# Patient Record
Sex: MALE | Race: OTHER | Employment: UNEMPLOYED | URBAN - METROPOLITAN AREA
[De-identification: names, ages, dates, MRNs, and addresses within clinical notes are randomized per-mention and may not be internally consistent; named-entity substitution may affect disease eponyms.]

---

## 2019-12-12 ENCOUNTER — OFFICE VISIT (OUTPATIENT)
Dept: ORTHOPEDIC SURGERY | Age: 24
End: 2019-12-12
Payer: COMMERCIAL

## 2019-12-12 VITALS
WEIGHT: 180 LBS | DIASTOLIC BLOOD PRESSURE: 69 MMHG | SYSTOLIC BLOOD PRESSURE: 126 MMHG | HEIGHT: 68 IN | HEART RATE: 53 BPM | BODY MASS INDEX: 27.28 KG/M2

## 2019-12-12 DIAGNOSIS — M76.821 POSTERIOR TIBIAL TENDONITIS OF RIGHT LEG: Primary | ICD-10-CM

## 2019-12-12 PROCEDURE — 4004F PT TOBACCO SCREEN RCVD TLK: CPT | Performed by: ORTHOPAEDIC SURGERY

## 2019-12-12 PROCEDURE — 99203 OFFICE O/P NEW LOW 30 MIN: CPT | Performed by: ORTHOPAEDIC SURGERY

## 2019-12-12 PROCEDURE — G8427 DOCREV CUR MEDS BY ELIG CLIN: HCPCS | Performed by: ORTHOPAEDIC SURGERY

## 2019-12-12 PROCEDURE — G8419 CALC BMI OUT NRM PARAM NOF/U: HCPCS | Performed by: ORTHOPAEDIC SURGERY

## 2019-12-12 PROCEDURE — G8484 FLU IMMUNIZE NO ADMIN: HCPCS | Performed by: ORTHOPAEDIC SURGERY

## 2019-12-12 ASSESSMENT — ENCOUNTER SYMPTOMS
COLOR CHANGE: 0
NAUSEA: 0
APNEA: 0
ABDOMINAL PAIN: 0
DIARRHEA: 0
ABDOMINAL DISTENTION: 0
CONSTIPATION: 0
VOMITING: 0
CHEST TIGHTNESS: 0
SHORTNESS OF BREATH: 0
COUGH: 0

## 2020-12-30 ENCOUNTER — OFFICE VISIT (OUTPATIENT)
Dept: ORTHOPEDIC SURGERY | Age: 25
End: 2020-12-30
Payer: COMMERCIAL

## 2020-12-30 VITALS
TEMPERATURE: 97.1 F | BODY MASS INDEX: 27.28 KG/M2 | HEART RATE: 90 BPM | WEIGHT: 180 LBS | DIASTOLIC BLOOD PRESSURE: 87 MMHG | HEIGHT: 68 IN | SYSTOLIC BLOOD PRESSURE: 145 MMHG

## 2020-12-30 PROCEDURE — 4004F PT TOBACCO SCREEN RCVD TLK: CPT | Performed by: PHYSICIAN ASSISTANT

## 2020-12-30 PROCEDURE — G8419 CALC BMI OUT NRM PARAM NOF/U: HCPCS | Performed by: PHYSICIAN ASSISTANT

## 2020-12-30 PROCEDURE — G8427 DOCREV CUR MEDS BY ELIG CLIN: HCPCS | Performed by: PHYSICIAN ASSISTANT

## 2020-12-30 PROCEDURE — G8484 FLU IMMUNIZE NO ADMIN: HCPCS | Performed by: PHYSICIAN ASSISTANT

## 2020-12-30 PROCEDURE — 99213 OFFICE O/P EST LOW 20 MIN: CPT | Performed by: PHYSICIAN ASSISTANT

## 2020-12-30 ASSESSMENT — ENCOUNTER SYMPTOMS
CHEST TIGHTNESS: 0
VOMITING: 0
CONSTIPATION: 0
APNEA: 0
DIARRHEA: 0
RESPIRATORY NEGATIVE: 1
COLOR CHANGE: 0
COUGH: 0
NAUSEA: 0
ABDOMINAL DISTENTION: 0
ABDOMINAL PAIN: 0
SHORTNESS OF BREATH: 0

## 2020-12-30 NOTE — PROGRESS NOTES
[facility] [address] AND SPORTS MEDICINE  [address] [place] [id number]  Dept: [phone]  Dept Fax: [phone]        Left Hip Office Visit    Subjective:     Chief Complaint   Patient presents with    Leg Pain     Right hamstring pain      HPI:     Gary Leyva presents with a 4 day history of pain in the left hamstring. He was playing flag football on Sunday and he had pain in the back of his leg. He states he had an injury similar to this over the summer. It seems to keep happening so he came in to be seen. He has never had a surgery on his knee/leg. He has tried icing and a massager. He states he has bruising and swelling. Occupation: Nurse at [employer].     ROS:     Review of Systems   Constitutional: Positive for activity change. Negative for appetite change, fatigue and fever. Respiratory: Negative. Negative for apnea, cough, chest tightness and shortness of breath. Cardiovascular: Negative. Negative for chest pain, palpitations and leg swelling. Gastrointestinal: Negative for abdominal distention, abdominal pain, constipation, diarrhea, nausea and vomiting. Genitourinary: Negative for difficulty urinating, dysuria and hematuria. Musculoskeletal: Positive for gait problem and myalgias. Negative for arthralgias and joint swelling. Skin: Negative for color change and rash. Neurological: Positive for weakness. Negative for dizziness, numbness and headaches. Psychiatric/Behavioral: Negative for sleep disturbance. Past Medical History:    Past Medical History:   Diagnosis Date    Acid reflux     Depression        Past Surgical History:    No past surgical history on file. CurrentMedications:   No current outpatient medications on file. No current facility-administered medications for this visit. Allergies:    Patient has no known allergies.     Social History:   Social History Socioeconomic History    Marital status: Single     Spouse name: Not on file    Number of children: Not on file    Years of education: Not on file    Highest education level: Not on file   Occupational History    Not on file   Social Needs    Financial resource strain: Not on file    Food insecurity     Worry: Not on file     Inability: Not on file    Transportation needs     Medical: Not on file     Non-medical: Not on file   Tobacco Use    Smoking status: Light Tobacco Smoker     Types: Cigars   Substance and Sexual Activity    Alcohol use: No    Drug use: No    Sexual activity: Not on file   Lifestyle    Physical activity     Days per week: Not on file     Minutes per session: Not on file    Stress: Not on file   Relationships    Social connections     Talks on phone: Not on file     Gets together: Not on file     Attends Religion service: Not on file     Active member of club or organization: Not on file     Attends meetings of clubs or organizations: Not on file     Relationship status: Not on file    Intimate partner violence     Fear of current or ex partner: Not on file     Emotionally abused: Not on file     Physically abused: Not on file     Forced sexual activity: Not on file   Other Topics Concern    Not on file   Social History Narrative    Not on file       Family History:  No family history on file. Vitals:   BP (!) 145/87   Pulse 90   Temp 97.1 °F (36.2 °C)   Ht 5' 8\" (1.727 m)   Wt 180 lb (81.6 kg)   BMI 27.37 kg/m²  Body mass index is 27.37 kg/m². Physical Examination:     Orthopedics:    GENERAL: Alert and oriented X3 in no acute distress. SKIN: Intact without lesions or ulcerations. Positive ecchymosis  NEURO: Intact to sensory and motor testing. VASC: Capillary refill is less than 3 seconds. Right Hamstring     GEN: Alert and oriented X 3, in no acute distress. GAIT: The patient's gait was observed while entering the exam room and was noted to be non antalgic. The extremity is in anatomic alignment. SKIN: Intact without rashes, lesions, or ulcerations. No obvious deformity or swelling. NEURO: The patient responds to light touch throughout bilateral LE. Patellar and Achilles reflexes are 2/4. VASC: The bilateral LE is neurovascularly intact with 2/4 DP and 2/4 PT pulses. Brisk capillary refill. ROM: Range of motion of the hip and the knee are full and nonpainful. MUSC: Strength is 5/5 flexion, abduction, internal rotation, external rotation. PALP: Pain to palpation of the distal hamstring more lateral and middle. No obvious deformity. Pain to palpation of the hematoma of the lateral hamstring    Assessment:     1. Hamstring strain, left, initial encounter      Procedures:    Procedure: no  Radiology:   No results found.    Plan: Electronically signed by Rocio Queen PA-C, on 12/31/2020 at 2:55 PM      Electronically signed by Rocio Queen PA-C, on 12/31/2020 at 2:53 PM

## 2020-12-30 NOTE — PATIENT INSTRUCTIONS
[address] [place] [address] and Sports Medicine    Dr. Paris Robbins,  & Osmany Zimmerman PA-C, ATC    Over the counter anti-inflammatory protocol (NSAIDS)    You may take the following over the counter medication for only 10-14 days to  reduce inflammation. If you develop an upset stomach, diarrhea, heartburn/GERD symptoms   discontinue immediately. If you need the medication on a long-term basis >greater than 10-14 days. Please contact your family doctor/PCP to discuss your options as you   will require ongoing medical monitoring. Over the Counter/OTC             Ibuprofen/Advil/Motrin                                                                                                            200 mg tablets                  3-4 tables 3 times a day with food             OR            Naproxen Sodium, Aleve                    220 mg tablets          2 tablets 2 times a day with food           You May Add                           Tylenol extra strength, Acetaminophen           500 mg tablets               2 tablets every 8 hours    You may alternate with the NSAIDS for pain. NO more than 3000 mg of Tylenol/acetaminophen in 24 hours. '  Look at any OTC medications for added  Tylenol/acetaminophen--cold/sinus/flu medication. Patient Education        Hamstring Strain: Care Instructions  Your Care Instructions     A hamstring strain happens when you overstretch, or pull, the muscles that run down the back of your thigh. It can happen when you exercise or lift something or if you're injured in an accident. You may feel pain and tenderness that's worse when you move your injured leg. The back of your thigh may be swollen and bruised. If you have a bad strain, you may not be able to move your leg normally. While a minor strain often heals well with rest and other treatment, a severe strain may require medical treatment. If a severe strain isn't treated, you may have long-lasting problems. Follow-up care is a key part of your treatment and safety. Be sure to make and go to all appointments, and call your doctor if you are having problems. It's also a good idea to know your test results and keep a list of the medicines you take. How can you care for yourself at home? · Rest your injured leg. Don't put weight on it for a day or two. If your doctor advises you to, use crutches to rest the leg. · Put ice or a cold pack on the back of your thigh for 10 to 20 minutes at a time to stop swelling. Put a thin cloth between the ice and your skin. · Wrapping your thigh with an elastic bandage (such as an Ace wrap), will help decrease swelling. Don't wrap it too tightly, since this can cause more swelling below the affected area. · Elevate your thigh on pillows while applying ice and anytime you are sitting or lying down. · Ask your doctor if you can take an over-the-counter pain medicine, such as acetaminophen (Tylenol), ibuprofen (Advil, Motrin), or naproxen (Aleve). Be safe with medicines. Read and follow all instructions on the label. · Don't do anything that makes the pain worse. Return to your usual level of activity slowly. When should you call for help? Call your doctor now or seek immediate medical care if:    · You have severe or increasing pain.     · You have tingling, weakness, or numbness in your injured leg.     · You cannot move your injured leg. Watch closely for changes in your health, and be sure to contact your doctor if:    · You do not get better as expected. Where can you learn more? Go to https://[employer] Solar Systems.Dynis. org and sign in to your [employer] account. Enter Y495 in the [employer] box to learn more about \"Hamstring Strain: Care Instructions. \"     If you do not have an account, please click on the \"Sign Up Now\" link. Current as of: March 2, 2020               Content Version: 12.6  © 6542-7478 [employer]. Care instructions adapted under license by [facility] ([facility]). If you have questions about a medical condition or this instruction, always ask your healthcare professional. [place] [address] any warranty or liability for your use of this information. Patient Education        Hamstring Strain: Rehab Exercises  Introduction  Here are some examples of exercises for you to try. The exercises may be suggested for a condition or for rehabilitation. Start each exercise slowly. Ease off the exercises if you start to have pain. You will be told when to start these exercises and which ones will work best for you. How to do the exercises  Hamstring set (heel dig)   1. Sit with your affected leg bent. Your good leg should be straight and supported on the floor. 2. Tighten the muscles on the back of your bent leg (hamstring) by pressing your heel into the floor. 3. Hold for about 6 seconds, and then rest for up to 10 seconds. 4. Repeat 8 to 12 times. Hamstring curl   1. Lie on your stomach with your knees straight. Place a pillow under your stomach. If your kneecap is uncomfortable, roll up a washcloth and put it under your leg just above your kneecap. 2. Lift the foot of your affected leg by bending your knee so that you bring your foot up toward your buttock. If this motion hurts, try it without bending your knee quite as far. This may help you avoid any painful motion. 3. Slowly move your leg up and down. 4. Repeat 8 to 12 times. 5. When you can do this exercise with ease and no pain, add some resistance. To do this:  6. Tie the ends of an exercise band together to form a loop. Attach one end of the loop to a secure object or shut a door on it to hold it in place. (Or you can have someone hold one end of the loop to provide resistance.)  7. Loop the other end of the exercise band around the lower part of your affected leg. 8. Repeat steps 1 through 4, slowly pulling back on the exercise band with your leg. Hip extension   1. Stand facing a wall with your hands on the wall at about chest level. 2. Keeping the knee of your affected leg straight, kick that leg straight back behind you. 3. Relax, and lower your leg back to the starting position. 4. Repeat 8 to 12 times. 5. When you can do this exercise with ease and no pain, add some resistance. To do this:  6. Tie the ends of an exercise band together to form a loop. Attach one end of the loop to a secure object or shut a door on it to hold it in place. (Or you can have someone hold one end of the loop to provide resistance.)  7. Loop the other end of the exercise band around the lower part of your affected leg. 8. Repeat steps 1 through 4, slowly pulling back on the exercise band with your leg. Hamstring wall stretch   1. Lie on your back in a doorway, with your good leg through the open door. 2. Slide your affected leg up the wall to straighten your knee. You should feel a gentle stretch down the back of your leg. 3. Hold the stretch for at least 1 minute to begin. Then try to lengthen the time you hold the stretch to as long as 6 minutes. 4. Repeat 2 to 4 times. 5. If you do not have a place to do this exercise in a doorway, there is another way to do it:  6. Lie on your back, and bend the knee of your affected leg. 7. Loop a towel under the ball and toes of that foot, and hold the ends of the towel in your hands. 8. Straighten your knee, and slowly pull back on the towel. You should feel a gentle stretch down the back of your leg. 9. Hold the stretch for 15 to 30 seconds. Or even better, hold the stretch for 1 minute if you can. 10. Repeat 2 to 4 times. 1. Do not arch your back. 2. Do not bend either knee. 3. Keep one heel touching the floor and the other heel touching the wall. Do not point your toes.     Calf stretch 1. Stand facing a wall with your hands on the wall at about eye level. Put your affected leg about a step behind your other leg. 2. Keeping your back leg straight and your back heel on the floor, bend your front knee and gently bring your hip and chest toward the wall until you feel a stretch in the calf of your back leg. 3. Hold the stretch for 15 to 30 seconds. 4. Repeat 2 to 4 times. 5. Repeat steps 1 through 4, but this time keep your back knee bent. Single-leg balance   1. Stand on a flat surface with your arms stretched out to your sides like you are making the letter \"T. \" Then lift your good leg off the floor, bending it at the knee. If you are not steady on your feet, use one hand to hold on to a chair, counter, or wall. 2. Standing on your affected leg, keep that knee straight. Try to balance on that leg for up to 30 seconds. Then rest for up to 10 seconds. 3. Repeat 6 to 8 times. 4. When you can balance on your affected leg for 30 seconds with your eyes open, try to balance on it with your eyes closed. 5. When you can do this exercise with your eyes closed for 30 seconds and with ease and no pain, try standing on a pillow or piece of foam, and repeat steps 1 through 4. Follow-up care is a key part of your treatment and safety. Be sure to make and go to all appointments, and call your doctor if you are having problems. It's also a good idea to know your test results and keep a list of the medicines you take. Where can you learn more? Go to https://zac.Gema Touch. org and sign in to your [employer] account. Enter [id number] [id number] in the [employer] box to learn more about \"Hamstring Strain: Rehab Exercises. \"     If you do not have an account, please click on the \"Sign Up Now\" link. Current as of: March 2, 2020               Content Version: 12.6  © 7385-6707 [employer].

## 2021-01-05 ENCOUNTER — HOSPITAL ENCOUNTER (OUTPATIENT)
Dept: PHYSICAL THERAPY | Facility: CLINIC | Age: 26
Setting detail: THERAPIES SERIES
Discharge: HOME | End: 2021-01-05
Payer: COMMERCIAL

## 2021-01-05 PROCEDURE — 97110 THERAPEUTIC EXERCISES: CPT

## 2021-01-05 PROCEDURE — 97161 PT EVAL LOW COMPLEX 20 MIN: CPT

## 2021-01-05 NOTE — CONSULTS
[] Candy Salazar        Outpatient Physical                Therapy       [address]       Phone: [phone]       Fax: [phone] [x] [facility]       Promotion at [address] [facility]       Phone: [phone]       Fax: [phone] [] [facility]      for [facility]    [address]     Phone: [phone]     Fax:  [phone]     Physical Therapy Lower Extremity Evaluation    Date:  2021  Patient: Doug Holder  : 1995  MRN: [id number]  Physician: Yelena Blair PA-C   Insurance: [employer] (25 vs)  Medical Diagnosis: Hamstring strain, left, initial encounter  Rehab Codes: X10.217D, R53.1, [id number], M25.662, M25.562  Onset date: 20  Next 's appt. : 20        Subjective:    CC: Pt arrives for physical therapy evaluation of left hamstring strain - notes he pulled it this summer playing flag football, felt a pop. Took himself out of playing for 2 months, no formal PT, then played his first game again 2 weeks ago and it was swollen/bruised, very painful after this. Notes he's had prior injuries like this on both hamstrings, but only LLE currently.          Prior Level of Function: pain free with all ADL/IADLs, running, playing flag football     Current Level of Function: unable to play flag football/workout 3 days a week (mix of UE/LE), hasn't attempted running d/t pain; some pain with squatting/functional movements      Red Flags:      Yes  No Comments   Fatigue [] [x]     Fever/chills/sweats [] [x]    Malaise  [] [x]    Mental status change [] [x]     Paresthesias/numbness/weakness [] [x]     Unexplained weight changes [] [x]     Lightheaded/dizziness [] [x]    Shortness of breath [] [x]           PMHx: [] Unremarkable [] Diabetes [] HTN  [] Pacemaker   [] MI/Heart Problems [] Cancer [] Arthritis [] Other:              [x] Refer to full medical chart  In EPIC   Tests: [] X-Ray: [] MRI:  [] Other:     Comorbidities:   [] Obesity [] Dialysis  [] Other:   [] Asthma/COPD [] Dementia [] Other:   [] Stroke [] Sleep apnea [] Other:   [] Vascular disease [] Rheumatic disease [] Other:       Medications: [x] Refer to full medical record [] None [] Other:  Allergies:      [x] Refer to full medical record [] None [] Other:    Working:  Nurse at [employer]  Job/ADL Description: walking, stooping, bending; no heavy lifting per pt      Pain:  [] Yes  [x] No Location: left posterior hamstring Pain Rating: (0-10 scale) 0/10  Pain altered Tx:  [] Yes  [x] No  Action:  Symptoms:  [x] Improving [] Worsening [] Same  Better:  Rest, ice  Worse: exercising  Sleep: [x] OK    [] Disturbed    Patient Goals: return to [employer] football, regular exercising        OBJECTIVE:    Observation:  OBSERVATION No Deficit Deficit Not Tested Comments   Posture   x       Palpation [] [x] [] TTP semitendinosis/membranosis muscle belly, no tenderness biceops femoris   Sensation [x] [] []    Edema [x] [] []  No popliteal fossa edema appreciated      Neurological [] [] [x]     Patellar Mobility [] [] [x]     Patellar Orientation [] [] [x]     Gait [x] [] [] No apparent deficit, good gait speed, no antalgia             ROM  ° A/P STRENGTH    Left Right Left Right   Hip Flex WNL WNL 5 5   Ext   4+ 5-   ER WNL WNL     IR WNL WNL     ABD   5- 5   ADD   5 5   Knee Flx       Ext 0 0 5 5   Ankle DF tight tight 5 5   PF   5- 5-   INV       EVER              SLR: LLE to 80deg, RLE to 75deg (pt notes he has been stretching LLE since injury)    Special Tests: Positive: tight muscularly/tender in lateral hamstring muscle belly, distally      Negative: Ely's for rectus femoris tightness       Functional Tests:   - Stairs: reciprocal ascent/descent without UE assist, no difficulty or pain  - Lunge: Hand support at knee with descent for both, no pain; quad dominant form      Functional Assessment    FUNCTION Normal Difficult Unable Comments   Sitting [x] [] [] in contralateral hand without increased pain, indicating improved tolerance to load  4. Able to run at self selected pace on treadmill for 10 min without increased pain in hamstring during or post workout, demonstrating good mechanics                      Patient goals: \"return to flag football, regular work outs\"      Outcome Measure on Initial Eval:  LEFI: 66/80, 83% function     Additional Outcome Measures Used: None        Rehab Potential:  [x] Good  [] Fair  [] Poor   Suggested Professional Referral:  [x] No  [] Yes:  Barriers to Goal Achievement[de-identified]  [x] No  [] Yes:  Domestic Concerns:  [x] No  [] Yes:    Pt. Education:  [x] Plans/Goals, Risks/Benefits discussed  [x] Home exercise program    Method of Education: [x] Verbal  [x] Demo  [x] Written  Comprehension of Education:  [x] Verbalizes understanding. [x] Demonstrates understanding. [] Needs Review. [] Demonstrates/verbalizes understanding of HEP/Ed previously given. Treatment Plan:  [x] Therapeutic Exercise   51160  [] Iontophoresis: 4 mg/mL Dexamethasone Sodium Phosphate  mAmin  49101   [x] Therapeutic Activity  33848 [x] Vasopneumatic cold with compression  12669    [x] Gait Training   57060 [] Ultrasound   99800   [x] Neuromuscular Re-education  00412 [] Electrical Stimulation Unattended  70168   [x] Manual Therapy  82365 [] Electrical Stimulation Attended  41505   [x] Instruction in HEP  [] Dry Needling   [] Aquatic Therapy   38678 [x] Cold/hotpack    [] Massage   18821      [] Lumbar/Cervical Traction  93624     []  Medication allergies reviewed for use of    Dexamethasone Sodium Phosphate 4mg/ml     with iontophoresis treatments. Pt is not allergic.     Frequency:  2 x/week for 16 visits        Todays Treatment:  Modalities:   Precautions:  Exercises:  Exercise Reps/ Time Weight/ Level Comments   Supine      90/90 hamstring stretch 2x15     Hamstring iso 10x3\" ea 90deg  40deg    Bridges 20x  Easy   SL bridges 2x10 LLE    SL hip abd ADD         SLS w/ arm raise 20x           Other: Pt education provided re: pathology, PT POC - billed with therex    Specific Instructions for next treatment:   - progress to resisted hamstring strengthening in prone, standing as tolerated  - SLS standing with perturbations  - bridges with concurrent heel slide out (short range to avoid excessive lengthening)  - dead lifting, stiff leg and bend leg - low weights to start  - lunges, squatting      Evaluation Complexity:  History (Personal factors, comorbidities) [] 0 [x] 1-2 [] 3+   Exam (limitations, restrictions) [] 1-2 [] 3 [x] 4+   Clinical presentation (progression) [x] Stable [] Evolving  [] Unstable   Decision Making [x] Low [] Moderate [] High    [x] Low Complexity [] Moderate Complexity [] High Complexity       Treatment Charges: Mins Units   [x] Evaluation       []  Low       []  Moderate       []  High 32 1   []  Modalities     [x]  Ther Exercise 20 1   []  Manual Therapy     []  Ther Activities     []  Aquatics     []  Vasocompression     []  Other       TOTAL TREATMENT TIME: 52 min    Time in:9:40 am   Time Out: 10:35 am    Electronically signed by: Caro Macdonald PT        Physician Signature:________________________________Date:__________________  By signing above or cosigning this note, I have reviewed this plan of care and certify a need for medically necessary rehabilitation services.      *PLEASE SIGN ABOVE AND FAX BACK ALL PAGES*

## 2021-01-12 ENCOUNTER — HOSPITAL ENCOUNTER (OUTPATIENT)
Dept: PHYSICAL THERAPY | Facility: CLINIC | Age: 26
Setting detail: THERAPIES SERIES
Discharge: HOME | End: 2021-01-12
Payer: COMMERCIAL

## 2021-01-12 PROCEDURE — 97110 THERAPEUTIC EXERCISES: CPT

## 2021-01-12 NOTE — FLOWSHEET NOTE
[] [facility]  [address]  [phone]  F: [phone] [x] [address]  [place] [address]   [address]  P: [phone]  F: [phone] [] [place]  [address]  P: [phone]  F: [phone] [] [address]  P: [phone]  F: [phone] [] [address]  [facility]   [address]   [place]: [phone]  F: [phone]      Physical Therapy Daily Treatment Note    Date:  2021  Patient Name:  Lachelle Villagomez    :  1995  MRN: [id number]  Physician: Chen Healy PA-C                              Insurance: [employer] (25 vs)  Medical Diagnosis: Hamstring strain, left, initial encounter  Rehab Codes: Q00.341K, R53.1, E459983, M25.662, M25.562  Onset date: 20             Next Dr's appt. : 20  Visit# / total visits:      Cancels/No Shows: 0 cx / 1 ns    Subjective:    Pain:  [] Yes  [x] No Location:  N/A Pain Rating: (0-10 scale) 0/10  Pain altered Tx:  [x] No  [] Yes  Action:  Comments: Pt arrives noting no pain at rest currently - states he was able to run yesterday at the gym for 1 mi without issues. States he's usually okay running, it hurts more when rapid starting/stopping for flag football (hasn't done this kind of exercising since injury).     Objective:  Modalities:   Precautions:  Exercises:  Exercise Reps/ Time Weight/ Level Comments   T[employer]rix bike 5 min           Supine      Hamstring 90/90 stretch 2x20 ea     Hamstring iso 10x ea     SL bridges 2x15 ea     Bridges w/ heel slider 6x  Too much compensation on mat - complete with physioball next sessions if able         TrA set 15x3\"     TrA w/ diaphragmatic breathing 3x5 breaths     TrA w/ DKTC to out straight 3x10 Prone      Resisted ham curl 3x15 ea grey    Flying squirrels 2x15           Standing      SLS on foam 3x30\" ea  TrA contraction during   SLS, foam, ball catch 2x8 ea  Straight throw   SLS, foam, ball twist and throw 5x ea  Twist laterally and throw to therapist   SLS, foam, perturbations   Add in upcoming sessions         Squats 3x10 20#    Deadlift hinge x10  x10 10#  20# Stiff leg, hinging at hips    Nordics   Add in upcoming sessions         Agility ladder   Add in upcoming sessions         Finishing stretches      Calf incline stretch 3x30\"     Hamstring 90/90 stretch 20x ea           Other:      Treatment Charges: Mins Units   []  Modalities     [x]  Ther Exercise 65 4   []  Manual Therapy     []  Ther Activities     []  Aquatics     []  Vasocompression     []  Other     Total Treatment time 65 4       Assessment: [x] Progressing toward goals. Initiated treatment focused on concentric/eccentric hamstring strengthening (L>R but bilat since this condition has happened bilat) as well as neuromuscular control training in SLS. Pt does well with all, intermittent cuing for proper completion d/t being new exercises. Introduced TrA core training with diphragmatic breathing to allow for no \"bearing down\" when analisa core; difficult for pt with diaphragm control but able to complete with good TrA activation. Some medial R proximal calf pain noted, this clears up with calf stretch. No hamstring pain reported throughout, just fatigue. [] No change. [] Other:  [x] Patient would continue to benefit from skilled physical therapy services in order to: progress hamstring strength both in open/closed chain, progress to dynamic lengthening/eccentric strengthening, agility training and progressing back to sport. STG: (to be met in 8 treatments)  1. ? Pain: No more than 5/10 pain reported post therapy sessions to indicate good tolerance to progressive exercise.   2. ? ROM: Left hamstring SLR length to at least 90deg without pain to indicate improving flexibility post injury  3. ? Balance: Able to maintain SLS on LLE with perturbations for 1 min with minimal trunk sway  4. Strength:   a. At least 5/5 L hamstring strength without pain to indicate improving tolerance to load  b. Pt able to complete SL deadlift without weight on LLE SLS with no UE assist to indicate improving eccentric control of hamstring  5. ? Function: Pt will be able to ambulate 10 min on treadmill at 2. 5mph without UE assist while demonstrating improved step length bilat, consistent heel strike, and upright trunk. 6. Independent with Home Exercise Programs     LTG: (to be met in 16 treatments)  1. Symmetrical hamstring SLR length without pain noted in LLE to indicate normalized tissue flexibility  2. 5/5 L hamstring strength to indicate normalized muscle strength needed for return to recreational activities  3. Able to complete stiff leg single-limb LLE deadlift with 20 lbs in contralateral hand without increased pain, indicating improved tolerance to load  4. Able to run at self selected pace on treadmill for 10 min without increased pain in hamstring during or post workout, demonstrating good mechanics                       Patient goals: \"return to flag football, regular work outs\"       Pt. Education:  [x] Yes  [] No  [x] Reviewed Prior HEP/Ed  Method of Education: [x] Verbal  [] Demo  [] Written  Comprehension of Education:  [x] Verbalizes understanding. [] Demonstrates understanding. [] Needs review. [] Demonstrates/verbalizes HEP/Ed previously given. Plan: [x] Continue current frequency toward long and short term goals.     [x] Specific Instructions for subsequent treatments: progress hamstring concentric/eccentric strength, agility training in future sessions      Time In: 11:30 am            Time Out: 12:45 pm    Electronically signed by:  Chery Dao, PT

## 2021-01-26 ENCOUNTER — APPOINTMENT (OUTPATIENT)
Dept: PHYSICAL THERAPY | Facility: CLINIC | Age: 26
End: 2021-01-26
Payer: COMMERCIAL

## 2021-01-29 ENCOUNTER — APPOINTMENT (OUTPATIENT)
Dept: PHYSICAL THERAPY | Facility: CLINIC | Age: 26
End: 2021-01-29
Payer: COMMERCIAL